# Patient Record
Sex: FEMALE | Race: ASIAN | NOT HISPANIC OR LATINO
[De-identification: names, ages, dates, MRNs, and addresses within clinical notes are randomized per-mention and may not be internally consistent; named-entity substitution may affect disease eponyms.]

---

## 2018-10-26 ENCOUNTER — RESULT REVIEW (OUTPATIENT)
Age: 58
End: 2018-10-26

## 2021-11-02 ENCOUNTER — APPOINTMENT (OUTPATIENT)
Dept: OBGYN | Facility: CLINIC | Age: 61
End: 2021-11-02

## 2022-05-16 ENCOUNTER — NON-APPOINTMENT (OUTPATIENT)
Age: 62
End: 2022-05-16

## 2022-05-17 ENCOUNTER — APPOINTMENT (OUTPATIENT)
Dept: OBGYN | Facility: CLINIC | Age: 62
End: 2022-05-17
Payer: COMMERCIAL

## 2022-05-17 VITALS
HEIGHT: 59 IN | WEIGHT: 154 LBS | DIASTOLIC BLOOD PRESSURE: 83 MMHG | SYSTOLIC BLOOD PRESSURE: 128 MMHG | BODY MASS INDEX: 31.04 KG/M2

## 2022-05-17 DIAGNOSIS — Z01.419 ENCOUNTER FOR GYNECOLOGICAL EXAMINATION (GENERAL) (ROUTINE) W/OUT ABNORMAL FINDINGS: ICD-10-CM

## 2022-05-17 DIAGNOSIS — R92.2 INCONCLUSIVE MAMMOGRAM: ICD-10-CM

## 2022-05-17 DIAGNOSIS — Z87.09 PERSONAL HISTORY OF OTHER DISEASES OF THE RESPIRATORY SYSTEM: ICD-10-CM

## 2022-05-17 DIAGNOSIS — Z87.39 PERSONAL HISTORY OF OTHER DISEASES OF THE MUSCULOSKELETAL SYSTEM AND CONNECTIVE TISSUE: ICD-10-CM

## 2022-05-17 DIAGNOSIS — Z78.9 OTHER SPECIFIED HEALTH STATUS: ICD-10-CM

## 2022-05-17 DIAGNOSIS — Z80.3 FAMILY HISTORY OF MALIGNANT NEOPLASM OF BREAST: ICD-10-CM

## 2022-05-17 DIAGNOSIS — Z86.39 PERSONAL HISTORY OF OTHER ENDOCRINE, NUTRITIONAL AND METABOLIC DISEASE: ICD-10-CM

## 2022-05-17 DIAGNOSIS — Z86.79 PERSONAL HISTORY OF OTHER DISEASES OF THE CIRCULATORY SYSTEM: ICD-10-CM

## 2022-05-17 DIAGNOSIS — M79.621 PAIN IN RIGHT UPPER ARM: ICD-10-CM

## 2022-05-17 DIAGNOSIS — Z12.11 ENCOUNTER FOR SCREENING FOR MALIGNANT NEOPLASM OF COLON: ICD-10-CM

## 2022-05-17 PROBLEM — Z00.00 ENCOUNTER FOR PREVENTIVE HEALTH EXAMINATION: Status: ACTIVE | Noted: 2022-05-17

## 2022-05-17 PROCEDURE — 99214 OFFICE O/P EST MOD 30 MIN: CPT | Mod: 25

## 2022-05-17 PROCEDURE — 99396 PREV VISIT EST AGE 40-64: CPT

## 2022-05-17 RX ORDER — ROSUVASTATIN CALCIUM 5 MG/1
5 TABLET, FILM COATED ORAL
Refills: 0 | Status: ACTIVE | COMMUNITY

## 2022-05-17 RX ORDER — IRBESARTAN 300 MG/1
TABLET, FILM COATED ORAL
Refills: 0 | Status: ACTIVE | COMMUNITY

## 2022-05-17 RX ORDER — BUDESONIDE AND FORMOTEROL FUMARATE DIHYDRATE 160; 4.5 UG/1; UG/1
AEROSOL RESPIRATORY (INHALATION)
Refills: 0 | Status: ACTIVE | COMMUNITY

## 2022-05-17 RX ORDER — AMLODIPINE BESYLATE 5 MG/1
5 TABLET ORAL
Refills: 0 | Status: ACTIVE | COMMUNITY

## 2022-05-17 RX ORDER — ALBUTEROL SULFATE 90 UG/1
AEROSOL, METERED RESPIRATORY (INHALATION)
Refills: 0 | Status: ACTIVE | COMMUNITY

## 2022-05-17 RX ORDER — CHOLECALCIFEROL (VITAMIN D3) 25 MCG
TABLET ORAL
Refills: 0 | Status: ACTIVE | COMMUNITY

## 2022-05-17 RX ORDER — ALLOPURINOL 100 MG/1
100 TABLET ORAL
Refills: 0 | Status: ACTIVE | COMMUNITY

## 2022-05-18 ENCOUNTER — APPOINTMENT (OUTPATIENT)
Dept: OBGYN | Facility: CLINIC | Age: 62
End: 2022-05-18

## 2022-05-18 PROBLEM — Z12.11 SCREENING FOR COLON CANCER: Status: ACTIVE | Noted: 2022-05-18

## 2022-05-18 LAB — HPV HIGH+LOW RISK DNA PNL CVX: NOT DETECTED

## 2022-05-18 NOTE — HISTORY OF PRESENT ILLNESS
[Patient reported mammogram was normal] : Patient reported mammogram was normal [Y] : Positive pregnancy history [Normal Amount/Duration] :  normal amount and duration [Currently In Menopause] : currently in menopause [Currently Active] : currently active [Men] : men [No] : No [Patient reported PAP Smear was normal] : Patient reported PAP Smear was normal [Mammogramdate] : 08/2021 [PapSmeardate] : 2021 [BoneDensityDate] : 2022 [TextBox_37] : mild osteopenia [PGHxTotal] : 3 [[facility]] : [facility] [[facility]] : [facility] [FreeTextEntry1] : 01/01/10

## 2022-05-18 NOTE — REASON FOR VISIT
[Annual] : an annual visit. [FreeTextEntry2] : 61 yr present for an annual exam, c/o of swelling under her armpit

## 2022-05-18 NOTE — DISCUSSION/SUMMARY
[FreeTextEntry1] : 62 y/o presents for an annual preventitive care gyn exam\par right axillary discomfort - presumptively due to covid vaccination- unremarkable exam\par rx breast sono\par vulvar eczema under control- f/u with Dr Nuñez\par Incidental asymptomatic endocervical polyp found on exam today\par pt counselled\par referred for pelvic sono and then to RTO for a polyp removal\par SBE

## 2022-05-18 NOTE — PHYSICAL EXAM
[Chaperone Present] : A chaperone was present in the examining room during all aspects of the physical examination [Appropriately responsive] : appropriately responsive [Alert] : alert [No Acute Distress] : no acute distress [No Lymphadenopathy] : no lymphadenopathy [Regular Rate Rhythm] : regular rate rhythm [Soft] : soft [Non-tender] : non-tender [Non-distended] : non-distended [No HSM] : No HSM [No Lesions] : no lesions [No Mass] : no mass [Oriented x3] : oriented x3 [Examination Of The Breasts] : a normal appearance [No Masses] : no breast masses were palpable [Labia Majora] : normal [Labia Minora] : normal [Polyp ___ cm] : [unfilled] ~[facility] polyp [Normal] : normal [Uterine Adnexae] : normal [FreeTextEntry1] : JAJA Bach [FreeTextEntry8] : R/V CONF, NO GROSS MASSES NOTED

## 2022-05-20 LAB — CYTOLOGY CVX/VAG DOC THIN PREP: NORMAL

## 2022-05-31 ENCOUNTER — APPOINTMENT (OUTPATIENT)
Dept: OBGYN | Facility: CLINIC | Age: 62
End: 2022-05-31
Payer: COMMERCIAL

## 2022-05-31 ENCOUNTER — ASOB RESULT (OUTPATIENT)
Age: 62
End: 2022-05-31

## 2022-05-31 PROCEDURE — 76830 TRANSVAGINAL US NON-OB: CPT

## 2022-06-01 ENCOUNTER — TRANSCRIPTION ENCOUNTER (OUTPATIENT)
Age: 62
End: 2022-06-01

## 2022-06-07 ENCOUNTER — APPOINTMENT (OUTPATIENT)
Dept: ULTRASOUND IMAGING | Facility: CLINIC | Age: 62
End: 2022-06-07

## 2022-06-29 ENCOUNTER — TRANSCRIPTION ENCOUNTER (OUTPATIENT)
Age: 62
End: 2022-06-29

## 2022-07-17 ENCOUNTER — TRANSCRIPTION ENCOUNTER (OUTPATIENT)
Age: 62
End: 2022-07-17

## 2022-07-27 ENCOUNTER — TRANSCRIPTION ENCOUNTER (OUTPATIENT)
Age: 62
End: 2022-07-27

## 2022-08-31 ENCOUNTER — TRANSCRIPTION ENCOUNTER (OUTPATIENT)
Age: 62
End: 2022-08-31

## 2022-08-31 DIAGNOSIS — Z12.39 ENCOUNTER FOR OTHER SCREENING FOR MALIGNANT NEOPLASM OF BREAST: ICD-10-CM

## 2022-09-13 ENCOUNTER — APPOINTMENT (OUTPATIENT)
Dept: OBGYN | Facility: CLINIC | Age: 62
End: 2022-09-13

## 2022-09-13 VITALS
SYSTOLIC BLOOD PRESSURE: 129 MMHG | HEART RATE: 75 BPM | DIASTOLIC BLOOD PRESSURE: 85 MMHG | RESPIRATION RATE: 17 BRPM | BODY MASS INDEX: 31.85 KG/M2 | OXYGEN SATURATION: 98 % | HEIGHT: 59 IN | WEIGHT: 158 LBS

## 2022-09-13 DIAGNOSIS — N84.1 POLYP OF CERVIX UTERI: ICD-10-CM

## 2022-09-13 PROCEDURE — 57500 BIOPSY OF CERVIX: CPT

## 2022-09-20 LAB — CORE LAB BIOPSY: NORMAL

## 2022-09-23 ENCOUNTER — NON-APPOINTMENT (OUTPATIENT)
Age: 62
End: 2022-09-23

## 2022-10-04 ENCOUNTER — TRANSCRIPTION ENCOUNTER (OUTPATIENT)
Age: 62
End: 2022-10-04

## 2022-10-11 ENCOUNTER — TRANSCRIPTION ENCOUNTER (OUTPATIENT)
Age: 62
End: 2022-10-11

## 2022-10-23 ENCOUNTER — TRANSCRIPTION ENCOUNTER (OUTPATIENT)
Age: 62
End: 2022-10-23

## 2022-10-27 ENCOUNTER — TRANSCRIPTION ENCOUNTER (OUTPATIENT)
Age: 62
End: 2022-10-27

## 2022-11-21 DIAGNOSIS — R92.8 OTHER ABNORMAL AND INCONCLUSIVE FINDINGS ON DIAGNOSTIC IMAGING OF BREAST: ICD-10-CM

## 2022-11-22 ENCOUNTER — NON-APPOINTMENT (OUTPATIENT)
Age: 62
End: 2022-11-22

## 2022-11-23 ENCOUNTER — TRANSCRIPTION ENCOUNTER (OUTPATIENT)
Age: 62
End: 2022-11-23

## 2024-06-06 ENCOUNTER — NON-APPOINTMENT (OUTPATIENT)
Age: 64
End: 2024-06-06

## 2024-06-07 ENCOUNTER — APPOINTMENT (OUTPATIENT)
Dept: UROLOGY | Facility: CLINIC | Age: 64
End: 2024-06-07
Payer: COMMERCIAL

## 2024-06-07 VITALS
HEART RATE: 101 BPM | BODY MASS INDEX: 31.45 KG/M2 | TEMPERATURE: 97.1 F | WEIGHT: 156 LBS | SYSTOLIC BLOOD PRESSURE: 144 MMHG | DIASTOLIC BLOOD PRESSURE: 87 MMHG | HEIGHT: 59 IN | OXYGEN SATURATION: 98 %

## 2024-06-07 PROCEDURE — 99204 OFFICE O/P NEW MOD 45 MIN: CPT

## 2024-06-07 PROCEDURE — G2211 COMPLEX E/M VISIT ADD ON: CPT

## 2024-06-07 NOTE — HISTORY OF PRESENT ILLNESS
[Currently Experiencing ___] :  [unfilled] [Hematuria - Microscopic] : microscopic hematuria [None] : None [FreeTextEntry1] : Ms. Han is a very pleasant 63 year old woman here today for microscopic hematuria. Referred by PCP Dr. Chatterjee. 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 Her GYN referred her to a urologist that she was seeing initially but she reports his office is difficult getting to. UA 6-10 RBCs. CT images ordered from Dr. Huber reviewed showed unremarkable kidneys, ureters and bladder. She denies any gross hematuria, dysuria or difficulties urinating. Denies any personal or family history of kidney stones. Denies family history of urological cancers. Denies any history of smoking.

## 2024-06-07 NOTE — ASSESSMENT
[FreeTextEntry1] : Ms. Han is a very pleasant 63 year old woman here today for microscopic hematuria. Referred by PCP Dr. Chatterjee. 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 Her GYN referred her to a urologist that she was seeing initially but she reports his office is difficult getting to. UA 6-10 RBCs. CT images ordered from Dr. Huber reviewed showed unremarkable kidneys, ureters and bladder. She denies any gross hematuria, dysuria or difficulties urinating. Denies any personal or family history of kidney stones. Denies family history of urological cancers. Denies any history of smoking. UC. UA. RTO cystoscopy.  Patient is being seen today for evaluation and management of a chronic and longitudinal ongoing condition and I am of the primary treating physician

## 2024-06-11 DIAGNOSIS — N39.0 URINARY TRACT INFECTION, SITE NOT SPECIFIED: ICD-10-CM

## 2024-06-11 LAB
APPEARANCE: ABNORMAL
BACTERIA UR CULT: NORMAL
BACTERIA: NEGATIVE /HPF
BILIRUBIN URINE: NEGATIVE
BLOOD URINE: ABNORMAL
CALCIUM OXALATE CRYSTALS: PRESENT
CAST: NORMAL /LPF
COLOR: YELLOW
EPITHELIAL CELLS: 7 /HPF
GLUCOSE QUALITATIVE U: NEGATIVE MG/DL
KETONES URINE: NEGATIVE MG/DL
LEUKOCYTE ESTERASE URINE: ABNORMAL
MICROSCOPIC-UA: NORMAL
NITRITE URINE: NEGATIVE
PH URINE: 5
PROTEIN URINE: 30 MG/DL
RED BLOOD CELLS URINE: 2 /HPF
REVIEW: NORMAL
SPECIFIC GRAVITY URINE: 1.01
UROBILINOGEN URINE: 0.2 MG/DL
WHITE BLOOD CELLS URINE: 9 /HPF

## 2024-06-11 RX ORDER — AMOXICILLIN AND CLAVULANATE POTASSIUM 875; 125 MG/1; MG/1
875-125 TABLET, COATED ORAL TWICE DAILY
Qty: 6 | Refills: 0 | Status: ACTIVE | COMMUNITY
Start: 2024-06-11 | End: 1900-01-01

## 2024-06-18 ENCOUNTER — APPOINTMENT (OUTPATIENT)
Dept: UROLOGY | Facility: CLINIC | Age: 64
End: 2024-06-18
Payer: COMMERCIAL

## 2024-06-18 VITALS
OXYGEN SATURATION: 98 % | SYSTOLIC BLOOD PRESSURE: 127 MMHG | HEIGHT: 59 IN | TEMPERATURE: 97.1 F | WEIGHT: 156 LBS | HEART RATE: 105 BPM | BODY MASS INDEX: 31.45 KG/M2 | DIASTOLIC BLOOD PRESSURE: 78 MMHG

## 2024-06-18 VITALS
DIASTOLIC BLOOD PRESSURE: 78 MMHG | SYSTOLIC BLOOD PRESSURE: 127 MMHG | BODY MASS INDEX: 31.45 KG/M2 | HEIGHT: 59 IN | HEART RATE: 105 BPM | OXYGEN SATURATION: 98 % | WEIGHT: 156 LBS | TEMPERATURE: 97 F

## 2024-06-18 DIAGNOSIS — R31.29 OTHER MICROSCOPIC HEMATURIA: ICD-10-CM

## 2024-06-18 PROCEDURE — 99213 OFFICE O/P EST LOW 20 MIN: CPT | Mod: 25

## 2024-06-18 PROCEDURE — 52000 CYSTOURETHROSCOPY: CPT

## 2024-06-18 RX ORDER — MEPOLIZUMAB 100 MG/ML
100 INJECTION, SOLUTION SUBCUTANEOUS
Refills: 0 | Status: ACTIVE | COMMUNITY

## 2024-06-18 RX ORDER — METFORMIN HYDROCHLORIDE 500 MG/5ML
500 SOLUTION ORAL
Refills: 0 | Status: ACTIVE | COMMUNITY

## 2024-06-18 NOTE — HISTORY OF PRESENT ILLNESS
[FreeTextEntry1] : Very pleasant 63-year-old woman who presents for follow-up of microscopic hematuria.  She previously underwent a CT scan which demonstrated no kidney stones, hydronephrosis, nor renal masses.  Urinalysis was previously found to be 6-10 red blood cells per high-powered field.  Recent urine culture was contaminated, however urinalysis demonstrated 9 white blood cells per high-powered field, trace leukocyte esterase.  She completed a course of Augmentin.  She underwent a cystoscopy today which demonstrated no urothelial lesions in the bladder.

## 2024-06-18 NOTE — ASSESSMENT
[FreeTextEntry1] : Very pleasant 63-year-old woman who presents for follow-up of microscopic hematuria -CT scan demonstrates no kidney stones, hydronephrosis, renal masses -Prior urinalysis demonstrates 6-10 red blood cells per high-powered field.  Repeat urinalysis demonstrates 2 red blood cells per high-powered field, however 9 white blood cells per high-powered field, trace leukocyte esterase -Urine culture contaminated -Cystoscopy today demonstrates no urothelial lesions in the bladder -We again discussed potential etiologies of microscopic hematuria  -Follow-up in 6 months with repeat urine studies

## 2024-12-18 ENCOUNTER — APPOINTMENT (OUTPATIENT)
Dept: UROLOGY | Facility: CLINIC | Age: 64
End: 2024-12-18
Payer: COMMERCIAL

## 2024-12-18 VITALS
SYSTOLIC BLOOD PRESSURE: 154 MMHG | TEMPERATURE: 97.4 F | HEIGHT: 59 IN | WEIGHT: 156 LBS | BODY MASS INDEX: 31.45 KG/M2 | DIASTOLIC BLOOD PRESSURE: 94 MMHG | HEART RATE: 91 BPM | OXYGEN SATURATION: 98 %

## 2024-12-18 DIAGNOSIS — R31.29 OTHER MICROSCOPIC HEMATURIA: ICD-10-CM

## 2024-12-18 PROCEDURE — 99213 OFFICE O/P EST LOW 20 MIN: CPT

## 2024-12-18 PROCEDURE — G2211 COMPLEX E/M VISIT ADD ON: CPT | Mod: NC

## 2024-12-20 LAB
APPEARANCE: ABNORMAL
BACTERIA UR CULT: NORMAL
BACTERIA: NEGATIVE /HPF
BILIRUBIN URINE: NEGATIVE
BLOOD URINE: ABNORMAL
CALCIUM OXALATE CRYSTALS: PRESENT
CAST: NORMAL /LPF
COLOR: YELLOW
EPITHELIAL CELLS: 8 /HPF
GLUCOSE QUALITATIVE U: NEGATIVE MG/DL
KETONES URINE: NEGATIVE MG/DL
LEUKOCYTE ESTERASE URINE: NEGATIVE
MICROSCOPIC-UA: NORMAL
NITRITE URINE: NEGATIVE
PH URINE: 5
PROTEIN URINE: 100 MG/DL
RED BLOOD CELLS URINE: NORMAL /HPF
REVIEW: NORMAL
SPECIFIC GRAVITY URINE: 1.02
UROBILINOGEN URINE: 0.2 MG/DL
WHITE BLOOD CELLS URINE: 4 /HPF